# Patient Record
Sex: MALE | Race: WHITE | NOT HISPANIC OR LATINO | Employment: FULL TIME | URBAN - NONMETROPOLITAN AREA
[De-identification: names, ages, dates, MRNs, and addresses within clinical notes are randomized per-mention and may not be internally consistent; named-entity substitution may affect disease eponyms.]

---

## 2024-01-06 ENCOUNTER — HOSPITAL ENCOUNTER (EMERGENCY)
Facility: HOSPITAL | Age: 44
Discharge: HOME | End: 2024-01-06
Attending: EMERGENCY MEDICINE

## 2024-01-06 VITALS
BODY MASS INDEX: 27.3 KG/M2 | HEART RATE: 81 BPM | RESPIRATION RATE: 17 BRPM | HEIGHT: 71 IN | TEMPERATURE: 97.3 F | DIASTOLIC BLOOD PRESSURE: 72 MMHG | SYSTOLIC BLOOD PRESSURE: 129 MMHG | OXYGEN SATURATION: 100 % | WEIGHT: 195 LBS

## 2024-01-06 DIAGNOSIS — J11.1 FLU: Primary | ICD-10-CM

## 2024-01-06 LAB
FLUAV RNA RESP QL NAA+PROBE: NOT DETECTED
FLUBV RNA RESP QL NAA+PROBE: DETECTED
RSV RNA RESP QL NAA+PROBE: NOT DETECTED
SARS-COV-2 RNA RESP QL NAA+PROBE: NOT DETECTED

## 2024-01-06 PROCEDURE — 87637 SARSCOV2&INF A&B&RSV AMP PRB: CPT | Performed by: EMERGENCY MEDICINE

## 2024-01-06 PROCEDURE — 99283 EMERGENCY DEPT VISIT LOW MDM: CPT | Performed by: EMERGENCY MEDICINE

## 2024-01-06 RX ORDER — ONDANSETRON 4 MG/1
4 TABLET, FILM COATED ORAL EVERY 8 HOURS PRN
Qty: 6 TABLET | Refills: 0 | Status: SHIPPED | OUTPATIENT
Start: 2024-01-06 | End: 2024-01-09

## 2024-01-06 ASSESSMENT — PAIN - FUNCTIONAL ASSESSMENT: PAIN_FUNCTIONAL_ASSESSMENT: 0-10

## 2024-01-06 ASSESSMENT — COLUMBIA-SUICIDE SEVERITY RATING SCALE - C-SSRS
6. HAVE YOU EVER DONE ANYTHING, STARTED TO DO ANYTHING, OR PREPARED TO DO ANYTHING TO END YOUR LIFE?: NO
2. HAVE YOU ACTUALLY HAD ANY THOUGHTS OF KILLING YOURSELF?: NO
1. IN THE PAST MONTH, HAVE YOU WISHED YOU WERE DEAD OR WISHED YOU COULD GO TO SLEEP AND NOT WAKE UP?: NO

## 2024-01-06 ASSESSMENT — PAIN SCALES - GENERAL: PAINLEVEL_OUTOF10: 0 - NO PAIN

## 2024-01-06 NOTE — Clinical Note
Miko Swain was seen and treated in our emergency department on 1/6/2024.  He may return to work on 01/10/2024.  Off work until January 10th       If you have any questions or concerns, please don't hesitate to call.      Axel Pichardo MD

## 2024-01-07 NOTE — ED PROVIDER NOTES
HPI   Chief Complaint   Patient presents with    Fever       Chief complaint flulike symptoms  History of present illness this patient yesterday developed fever and chills and his temperature was 104 earlier today.  He received Motrin and the fever went away.  The patient has been achy and had vomited once this morning but is held fluid down ever since.  Patient is taking fluids well he did have several loose watery stools today but feels like he is keeping up with his fluid intake.  He has minimal cough nonproductive and a stuffy nose.  No abdominal pain no chest pain no shortness of breath no headache no stiff neck patient states he feels like he is maintaining his fluid intake quite well the patient is healthy chronically no past medical illnesses and denies any known allergies.    physical exam:    General: Vitals noted, no distress. Afebrile. Alert and oriented  x 4 .  Pupils equal and reactive bilaterally    EENT: TMs clear. Posterior oropharynx unremarkable. No meningismus. No LAD.     Cardiac: Regular, rate, rhythm, no murmurs rubs or gallops.     Pulmonary: Lungs clear bilaterally with good aeration. No adventitious breath sounds. No wheezes rales or rhonchi.     Abdomen: Soft, nonsurgical. Nontender. No peritoneal signs. Normoactive bowel sounds. No pulsatile masses.     Extremities: No peripheral edema. Negative Homans bilaterally, no cords.    Skin: No rash. Intact.     Neuro: No focal neurologic deficits, NIH score of 0. Cranial nerves normal as tested from II through XII.           History provided by:  Patient                      Irene Coma Scale Score: 15                  Patient History   Past Medical History:   Diagnosis Date    Other conditions influencing health status     No significant past medical history    Other conditions influencing health status     No significant past surgical history     Past Surgical History:   Procedure Laterality Date    OTHER SURGICAL HISTORY  07/11/2022    Cleft  lip repair    OTHER SURGICAL HISTORY  07/11/2022    Appendectomy     No family history on file.  Social History     Tobacco Use    Smoking status: Never    Smokeless tobacco: Never   Substance Use Topics    Alcohol use: Not on file    Drug use: Not on file       Physical Exam   ED Triage Vitals [01/06/24 1609]   Temp Heart Rate Resp BP   36.3 °C (97.3 °F) 97 17 131/83      SpO2 Temp src Heart Rate Source Patient Position   98 % -- -- --      BP Location FiO2 (%)     -- --       Physical Exam    ED Course & MDM   Diagnoses as of 01/06/24 1908   Flu       Medical Decision Making  The patient has positive flu B.  The patient states he does not want any further testing because he is taking fluids well he does not want IV he is able to tolerate fluids and only vomited once earlier this morning.  Patient wants to be discharged we did discuss the possibility of Tamiflu however patient declines Tamiflu we will provide him with a note to stay home from work and he can follow-up with the physician we will provide him with a list of physicians in the area from which she can choose.    Procedure  Procedures     Axel Pichardo MD  01/06/24 1911

## 2024-01-07 NOTE — DISCHARGE INSTRUCTIONS
Off work   return to the emergency department if any symptoms change or worsen.  Drink plenty of fluids.  Tylenol as needed.  I will prescribe Zofran for you in case you have nausea.  We offered Tamiflu however you declined.  Return to the ER if things get worse and we will give you a list of primary care physicians you can choose from to follow-up.